# Patient Record
Sex: FEMALE | Race: ASIAN | NOT HISPANIC OR LATINO
[De-identification: names, ages, dates, MRNs, and addresses within clinical notes are randomized per-mention and may not be internally consistent; named-entity substitution may affect disease eponyms.]

---

## 2023-01-13 ENCOUNTER — APPOINTMENT (OUTPATIENT)
Dept: UROLOGY | Facility: CLINIC | Age: 63
End: 2023-01-13
Payer: COMMERCIAL

## 2023-01-13 VITALS
SYSTOLIC BLOOD PRESSURE: 127 MMHG | WEIGHT: 131 LBS | TEMPERATURE: 97.2 F | HEIGHT: 63 IN | OXYGEN SATURATION: 96 % | RESPIRATION RATE: 16 BRPM | BODY MASS INDEX: 23.21 KG/M2 | HEART RATE: 120 BPM | DIASTOLIC BLOOD PRESSURE: 75 MMHG

## 2023-01-13 DIAGNOSIS — Z86.39 PERSONAL HISTORY OF OTHER ENDOCRINE, NUTRITIONAL AND METABOLIC DISEASE: ICD-10-CM

## 2023-01-13 DIAGNOSIS — Z86.79 PERSONAL HISTORY OF OTHER DISEASES OF THE CIRCULATORY SYSTEM: ICD-10-CM

## 2023-01-13 DIAGNOSIS — Z80.7 FAMILY HISTORY OF OTHER MALIGNANT NEOPLASMS OF LYMPHOID, HEMATOPOIETIC AND RELATED TISSUES: ICD-10-CM

## 2023-01-13 PROCEDURE — 99204 OFFICE O/P NEW MOD 45 MIN: CPT

## 2023-01-13 RX ORDER — ROSUVASTATIN CALCIUM 10 MG/1
10 TABLET, FILM COATED ORAL
Refills: 0 | Status: ACTIVE | COMMUNITY

## 2023-01-13 RX ORDER — ALBUTEROL 90 MCG
90 AEROSOL (GRAM) INHALATION
Refills: 0 | Status: ACTIVE | COMMUNITY

## 2023-01-13 RX ORDER — LOSARTAN POTASSIUM 25 MG/1
25 TABLET, FILM COATED ORAL
Refills: 0 | Status: ACTIVE | COMMUNITY

## 2023-01-13 RX ORDER — LORAZEPAM 0.5 MG/1
0.5 TABLET ORAL
Refills: 0 | Status: ACTIVE | COMMUNITY

## 2023-01-13 RX ORDER — CYCLOBENZAPRINE HYDROCHLORIDE 5 MG/1
5 TABLET, FILM COATED ORAL
Refills: 0 | Status: ACTIVE | COMMUNITY

## 2023-01-13 RX ORDER — PALIPERIDONE 1.5 MG/1
1.5 TABLET, FILM COATED, EXTENDED RELEASE ORAL
Refills: 0 | Status: ACTIVE | COMMUNITY

## 2023-01-13 NOTE — LETTER BODY
[FreeTextEntry1] : Lauri Pierre MD\par [address],\[place] [address]\par [phone] (W)\par [phone] (F)\par \par Dear Dr. Pierre,\par \par Reason for visit: Abnormal imaging. Bilateral renal stones.\par \par This is a retired 62 year-old Cantonese speaking woman with abnormal abdominal imaging. Patient underwent abdomen CT scan in November 2022, which demonstrated bilateral renal stones, 8 mm in the left kidney and 1 mm in the right kidney. Patient denies any flank pain, hematuria, or urinary difficulties. Patient denies any urinary incontinence. The patient denies any aggravating or relieving factors. The patient denies any interference of function. The patient is entirely asymptomatic. All other review of systems are negative. She has no cancer in her family medical history. She has no previous surgical history. Past medical history, family history and social history were inquired and were noncontributory to current condition. The patient does not use tobacco or drink alcohol. Medications and allergies were reviewed. She has no known allergies to medication.\par \par On examination, the patient is a healthy-appearing woman in no acute distress. She is alert and oriented follows commands. She has normal mood and affect. She is normocephalic. Neck is supple. Oral no thrush. Respirations are unlabored. Abdomen is soft and nontender. Bladder is nonpalpable. No CVA tenderness. Neurologically she is grossly intact. No peripheral edema. Skin without gross abnormality.\par \par I personally reviewed CT scan with the patient today and images demonstrated an 8 mm calculus in the lower pole of the left kidney and a 1 mm renal in the mid-lower pole of the right kidney.\par \par Assessment: Abnormal urinary imaging. Bilateral renal stones.\par \par I counseled the patient. I discussed treatment options with the patient. The patient would like proceed with active surveillance of their renal stones. I encouraged patient to maintain a low-protein low-sodium diet. I also encouraged hydration to prevent stone formation. I recommended she follow up in 6 months for repeat renal ultrasound to ensure stability. Thank you for the opportunity to participate in the care of Ms. LOWRY. I will keep you updated on her progress.\par \par Plan: Active surveillance. Stone diet. Follow up in 6 months for repeat renal ultrasound.

## 2023-01-13 NOTE — LETTER BODY
[FreeTextEntry1] : Lauri Pierre MD\par [address],\[place] [address]\par [phone] (W)\par [phone] (F)\par \par Dear Dr. Pierre,\par \par Reason for visit: Abnormal imaging. Bilateral renal stones.\par \par This is a retired 62 year-old Cantonese speaking woman with abnormal abdominal imaging. Patient underwent abdomen CT scan in November 2022, which demonstrated bilateral renal stones, 8 mm in the left kidney and 1 mm in the right kidney. Patient denies any flank pain, hematuria, or urinary difficulties. Patient denies any urinary incontinence. The patient denies any aggravating or relieving factors. The patient denies any interference of function. The patient is entirely asymptomatic. All other review of systems are negative. She has no cancer in her family medical history. She has no previous surgical history. Past medical history, family history and social history were inquired and were noncontributory to current condition. The patient does not use tobacco or drink alcohol. Medications and allergies were reviewed. She has no known allergies to medication.\par \par On examination, the patient is a healthy-appearing woman in no acute distress. She is alert and oriented follows commands. She has normal mood and affect. She is normocephalic. Neck is supple. Oral no thrush. Respirations are unlabored. Abdomen is soft and nontender. Bladder is nonpalpable. No CVA tenderness. Neurologically she is grossly intact. No peripheral edema. Skin without gross abnormality.\par \par I personally reviewed CT scan with the patient today and images demonstrated an 8 mm calculus in the lower pole of the left kidney and a 1 mm renal in the mid-lower pole of the right kidney.\par \par Assessment: Abnormal urinary imaging. Bilateral renal stones.\par \par I counseled the patient. I discussed treatment options with the patient. The patient would like proceed with active surveillance of their renal stones. I encouraged patient to maintain a low-protein low-sodium diet. I also encouraged hydration to prevent stone formation. I recommended she follow up in 6 months for repeat renal ultrasound to ensure stability. Thank you for the opportunity to participate in the care of Ms. LOWRY. I will keep you updated on her progress.\par \par Plan: Active surveillance. Stone diet. Follow up in 6 months for repeat renal ultrasound.

## 2023-01-14 LAB
APPEARANCE: CLEAR
BACTERIA: NEGATIVE
BILIRUBIN URINE: NEGATIVE
BLOOD URINE: NEGATIVE
COLOR: COLORLESS
GLUCOSE QUALITATIVE U: NEGATIVE
HYALINE CASTS: 0 /LPF
KETONES URINE: NEGATIVE
LEUKOCYTE ESTERASE URINE: NEGATIVE
MICROSCOPIC-UA: NORMAL
NITRITE URINE: NEGATIVE
PH URINE: 6
PROTEIN URINE: NEGATIVE
RED BLOOD CELLS URINE: 0 /HPF
SPECIFIC GRAVITY URINE: 1.01
SQUAMOUS EPITHELIAL CELLS: 0 /HPF
UROBILINOGEN URINE: NORMAL
WHITE BLOOD CELLS URINE: 0 /HPF

## 2023-01-15 NOTE — ADDENDUM
[FreeTextEntry1] : Entered by ARTURO ORELLANA, acting as scribe for Dr. Abelardo Andrews.\par The documentation recorded by the scribe accurately reflects the service I personally performed and the decisions made by me.

## 2023-07-07 ENCOUNTER — TRANSCRIPTION ENCOUNTER (OUTPATIENT)
Age: 63
End: 2023-07-07

## 2023-07-07 ENCOUNTER — APPOINTMENT (OUTPATIENT)
Dept: UROLOGY | Facility: CLINIC | Age: 63
End: 2023-07-07
Payer: MEDICARE

## 2023-07-07 DIAGNOSIS — N20.1 CALCULUS OF URETER: ICD-10-CM

## 2023-07-07 DIAGNOSIS — N20.0 CALCULUS OF KIDNEY: ICD-10-CM

## 2023-07-07 DIAGNOSIS — Z00.00 ENCOUNTER FOR GENERAL ADULT MEDICAL EXAMINATION W/OUT ABNORMAL FINDINGS: ICD-10-CM

## 2023-07-07 PROCEDURE — 99214 OFFICE O/P EST MOD 30 MIN: CPT

## 2023-07-07 PROCEDURE — 76775 US EXAM ABDO BACK WALL LIM: CPT

## 2023-07-07 RX ORDER — DOXAZOSIN 1 MG/1
1 TABLET ORAL
Qty: 30 | Refills: 3 | Status: ACTIVE | COMMUNITY
Start: 2023-07-07 | End: 1900-01-01

## 2023-07-07 NOTE — LETTER BODY
[FreeTextEntry1] : Lauri Pierre MD\par [address],\[place] [address]\par [phone] (W)\par [phone] (F)\par \par Dear Dr. Pierre,\par \par Reason for visit: Abnormal imaging. Bilateral renal stones.\par \par This is a retired 62 year-old Cantonese speaking woman with abnormal abdominal imaging. Patient underwent abdomen CT scan in November 2022, which demonstrated bilateral renal stones, 8 mm in the left kidney and 1 mm in the right kidney. Patient returns today for follow-up. Patient denies any flank pain, hematuria, or urinary difficulties. Patient denies any urinary incontinence. The patient denies any aggravating or relieving factors. The patient denies any interference of function. The patient is entirely asymptomatic. All other review of systems are negative. She has no cancer in her family medical history. She has no previous surgical history. Past medical history, family history and social history were inquired and were noncontributory to current condition. The patient does not use tobacco or drink alcohol. Medications and allergies were reviewed. She has no known allergies to medication.\par \par On examination, the patient is a healthy-appearing woman in no acute distress. She is alert and oriented follows commands. She has normal mood and affect. She is normocephalic. Neck is supple. Oral no thrush. Respirations are unlabored. Abdomen is soft and nontender. Bladder is nonpalpable. No CVA tenderness. Neurologically she is grossly intact. No peripheral edema. Skin without gross abnormality.\par \par I personally reviewed ultrasound images with the patient today and images demonstrated mild hydronephrosis present in the left kidney, a 1.0 cm echogenic focus with shadow and twinkle noted in the left proximal ureter, a 2 mm echogenic focus with twinkle in the left kidney lower pole.\par \par Assessment: Abnormal urinary imaging. Bilateral renal stones.\par \par I counseled the patient. Ultrasounds results today demonstrated mild hydronephrosis present in the left kidney. Stones migrated to the right side. I recommended she obtain a CT scan. I recommended she obtains BMP and urine culture. I recommended she follow up as directed. Thank you for the opportunity to participate in the care of Ms. LOWRY. I will keep you updated on her progress.\par \par Plan: BMP. Urine Culture. CT scan. Follow up as directed\par

## 2023-07-07 NOTE — LETTER BODY
[FreeTextEntry1] : Lauri Pierre MD\par [address],\[place] [address]\par [phone] (W)\par [phone] (F)\par \par Dear Dr. Pierre,\par \par Reason for visit: Abnormal imaging. Bilateral renal stones.\par \par This is a retired 62 year-old Cantonese speaking woman with abnormal abdominal imaging. Patient underwent abdomen CT scan in November 2022, which demonstrated bilateral renal stones, 8 mm in the left kidney and 1 mm in the right kidney. Patient returns today for follow-up. Patient denies any flank pain, hematuria, or urinary difficulties. Patient denies any urinary incontinence. The patient denies any aggravating or relieving factors. The patient denies any interference of function. The patient is entirely asymptomatic. All other review of systems are negative. She has no cancer in her family medical history. She has no previous surgical history. Past medical history, family history and social history were inquired and were noncontributory to current condition. The patient does not use tobacco or drink alcohol. Medications and allergies were reviewed. She has no known allergies to medication.\par \par On examination, the patient is a healthy-appearing woman in no acute distress. She is alert and oriented follows commands. She has normal mood and affect. She is normocephalic. Neck is supple. Oral no thrush. Respirations are unlabored. Abdomen is soft and nontender. Bladder is nonpalpable. No CVA tenderness. Neurologically she is grossly intact. No peripheral edema. Skin without gross abnormality.\par \par I personally reviewed ultrasound images with the patient today and images demonstrated mild hydronephrosis present in the left kidney, a 1.0 cm echogenic focus with shadow and twinkle noted in the left proximal ureter, a 2 mm echogenic focus with twinkle in the left kidney lower pole.\par \par Assessment: Abnormal urinary imaging. Bilateral renal stones.\par \par I counseled the patient. Ultrasounds results today demonstrated mild hydronephrosis present in the left kidney. Stones migrated to the right side. I recommended she obtain a CT scan. I recommended she obtains BMP and urine culture. I recommended she follow up as directed. Thank you for the opportunity to participate in the care of Ms. LOWRY. I will keep you updated on her progress.\par \par Plan: BMP. Urine Culture. CT scan. Follow up as directed\par